# Patient Record
(demographics unavailable — no encounter records)

---

## 2025-03-12 NOTE — ASSESSMENT
[FreeTextEntry1] :  We discussed aspects of IPP surgery and recovery in detail. He specifically understands the risks of the procedure including pain, bleeding, infection that could require emergent IPP explant, damage to nearby structures like the urethra, erosion, that a mechanical device could always malfunction and require additional procedures, etc. I specifically discussed expectations for postoperative erection length. He understands that erection length will be similar to his stretched penile length and that the glans will not become engorged after placement of an IPP. He was given ample time to ask questions and they were answered to his satisfaction, and he was provided with handouts. Will proceed with cystoscopy.  After the cysto will plan to do IPP with minijup for climacturia. Need medical history review to assess risk factors for surgery.

## 2025-03-12 NOTE — HISTORY OF PRESENT ILLNESS
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 Patient with minimal LUTS IPSS 5 QOL 1. He has minimal stress urinary incontinence doesn't need to wear pads. 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 He is  and has been suffering from ED since surgery. Has tried VICKY and ICI and not satisfied about the results. He still has good desire. No penile curvature but has noticed shortening which is not bothering him.  No UTI, Hematuria, Hx of urinary retention/catherization. No abdominal of hernia surgery other than the RP  No history of Neurological/ Back issues.. Regular Bowel movement, No OPAL.  On exam Abdomen lax non distended no hernia Penis: Circumcised with adequate meatus, both testes descended soft and non tender